# Patient Record
Sex: MALE | Race: BLACK OR AFRICAN AMERICAN | NOT HISPANIC OR LATINO | Employment: PART TIME | URBAN - METROPOLITAN AREA
[De-identification: names, ages, dates, MRNs, and addresses within clinical notes are randomized per-mention and may not be internally consistent; named-entity substitution may affect disease eponyms.]

---

## 2023-10-03 ENCOUNTER — TELEPHONE (OUTPATIENT)
Dept: UROLOGY | Facility: CLINIC | Age: 53
End: 2023-10-03

## 2023-10-03 NOTE — TELEPHONE ENCOUNTER
[facility] Call Center    Phone Message    May a detailed message be left on voicemail: yes     Reason for Call: Other: Patient is scheduled for a new infertility appt with Dr. Oleary on 11/30/23. Please reach out to patient for any necessary labs.     Action Taken: Message routed to:  [facility] ([facility] Urology    Travel Screening: Not Applicable

## 2023-10-05 DIAGNOSIS — Z31.41 ENCOUNTER FOR SEMEN ANALYSIS: Primary | ICD-10-CM

## 2023-10-26 NOTE — TELEPHONE ENCOUNTER
Action 2023 JTV 11:08 AM    Action Taken Patient states he had a SA done with [facility]. Patient gave VB OK to update medical records. Patient states he is in [place] for vacation and will not be back until 2023. Patient was advised to call and make an appointment with [facility] for another SA lab. [facility] gave patient the phone number to [facility].      MEDICAL RECORDS REQUEST   [facility]  Urology Clinic  [address] [place] [id number]  PHONE: [phone]  Fax: [phone]        FUTURE VISIT INFORMATION                                                   Long Carrasquillo, : 1970 scheduled for future visit at [facility]    APPOINTMENT INFORMATION:  Date: 2023  Provider:  Tj Oleary MD  Reason for Visit/Diagnosis: INFERTILITY    RECORDS REQUESTED FOR VISIT                                                     NOTES  STATUS/DETAILS   OFFICE NOTE from other specialist  yes, 2023 -- Riley Covarrubias MD  @ [facility]   OPERATIVE REPORT  yes   MEDICATION LIST  no   SEMEN ANALYSIS (LAST 2)  yes, 2023     PRE-VISIT CHECKLIST      Joint diagnostic appointment coordinated correctly          (ensure right order & amount of time) Yes   RECORD COLLECTION COMPLETE yes

## 2023-10-29 ENCOUNTER — HEALTH MAINTENANCE LETTER (OUTPATIENT)
Age: 53
End: 2023-10-29

## 2023-11-08 ENCOUNTER — PRE VISIT (OUTPATIENT)
Dept: UROLOGY | Facility: CLINIC | Age: 53
End: 2023-11-08
Payer: COMMERCIAL

## 2023-11-08 NOTE — CONFIDENTIAL NOTE
Reason for Visit: consult to discuss fertility    Orders/Procedures/Records:  in system    Rooming Requirements: normal      Ivanna Lopez  11/08/23  10:06 AM

## 2023-11-30 ENCOUNTER — OFFICE VISIT (OUTPATIENT)
Dept: UROLOGY | Facility: CLINIC | Age: 53
End: 2023-11-30
Payer: COMMERCIAL

## 2023-11-30 ENCOUNTER — LAB (OUTPATIENT)
Dept: LAB | Facility: CLINIC | Age: 53
End: 2023-11-30
Payer: COMMERCIAL

## 2023-11-30 ENCOUNTER — PRE VISIT (OUTPATIENT)
Dept: UROLOGY | Facility: CLINIC | Age: 53
End: 2023-11-30

## 2023-11-30 ENCOUNTER — ANCILLARY PROCEDURE (OUTPATIENT)
Dept: ULTRASOUND IMAGING | Facility: CLINIC | Age: 53
End: 2023-11-30
Attending: UROLOGY
Payer: COMMERCIAL

## 2023-11-30 VITALS
OXYGEN SATURATION: 100 % | WEIGHT: 165 LBS | HEART RATE: 89 BPM | DIASTOLIC BLOOD PRESSURE: 89 MMHG | RESPIRATION RATE: 18 BRPM | HEIGHT: 68 IN | SYSTOLIC BLOOD PRESSURE: 129 MMHG | BODY MASS INDEX: 25.01 KG/M2

## 2023-11-30 DIAGNOSIS — N50.812 LEFT TESTICULAR PAIN: ICD-10-CM

## 2023-11-30 DIAGNOSIS — R10.32 LEFT GROIN PAIN: ICD-10-CM

## 2023-11-30 DIAGNOSIS — R10.32 LEFT GROIN PAIN: Primary | ICD-10-CM

## 2023-11-30 DIAGNOSIS — Z31.41 ENCOUNTER FOR SEMEN ANALYSIS: ICD-10-CM

## 2023-11-30 LAB
ABNORMAL SPERM MORPHOLOGY: 90
ABSTINENCE DAYS: ABNORMAL
AGGLUTINATION: NO
ANALYSIS TEMP - CENTIGRADE: 19 CENTIGRADE
COLLECTION METHOD: ABNORMAL
COLLECTION SITE: ABNORMAL
CONSENT TO RELEASE TO PARTNER: NO
DAL- RECEIVED TIME: ABNORMAL
HEAD DEFECT: 87 %
IMMOTILE: 51 %
LIQUEFIED: YES
MIDPIECE DEFECT: 35 %
NON-PROGRESSIVE MOTILITY: 4 %
NORMAL SPERM MORPHOLOGY: 10 % NORMAL FORMS
PROGRESSIVE MOTILITY: 45 %
ROUND CELLS: <0.1 MILLION/ML
SPECIMEN PH: 7.2 PH
SPECIMEN VOLUME: 1.5 ML
SPERM CONCENTRATION: 337 MILLION/ML
TAIL DEFECT: 4 %
TIME OF ANALYSIS: ABNORMAL
TOTAL PROGRESSIVE MOTILE NUMBER: 228 MILLION
TOTAL SPERM NUMBER: 506 MILLION
VISCOUS: YES
VITALITY: ABNORMAL

## 2023-11-30 PROCEDURE — 76870 US EXAM SCROTUM: CPT | Mod: GC | Performed by: STUDENT IN AN ORGANIZED HEALTH CARE EDUCATION/TRAINING PROGRAM

## 2023-11-30 PROCEDURE — 93976 VASCULAR STUDY: CPT | Mod: GC | Performed by: STUDENT IN AN ORGANIZED HEALTH CARE EDUCATION/TRAINING PROGRAM

## 2023-11-30 PROCEDURE — 89322 SEMEN ANAL STRICT CRITERIA: CPT

## 2023-11-30 PROCEDURE — 99203 OFFICE O/P NEW LOW 30 MIN: CPT | Performed by: UROLOGY

## 2023-11-30 ASSESSMENT — PAIN SCALES - GENERAL: PAINLEVEL: EXTREME PAIN (8)

## 2023-11-30 ASSESSMENT — PATIENT HEALTH QUESTIONNAIRE - PHQ9
SUM OF ALL RESPONSES TO PHQ QUESTIONS 1-9: 25
10. IF YOU CHECKED OFF ANY PROBLEMS, HOW DIFFICULT HAVE THESE PROBLEMS MADE IT FOR YOU TO DO YOUR WORK, TAKE CARE OF THINGS AT HOME, OR GET ALONG WITH OTHER PEOPLE: EXTREMELY DIFFICULT
SUM OF ALL RESPONSES TO PHQ QUESTIONS 1-9: 25
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
## 2023-11-30 NOTE — NURSING NOTE
"Chief Complaint   Patient presents with    Consult     I need to see a urology specialist.        Blood pressure 129/89, pulse 89, resp. rate 18, height 1.727 m (5' 8\"), weight 74.8 kg (165 lb), SpO2 100%. Body mass index is 25.09 kg/m .    There is no problem list on file for this patient.      Allergies   Allergen Reactions    Vicodin [Hydrocodone-Acetaminophen] Diarrhea       Current Outpatient Medications   Medication Sig Dispense Refill    oxyCODONE-acetaminophen (PERCOCET) 5-325 MG per tablet Take 1 tablet by mouth every 6 hours as needed for moderate to severe pain (Patient not taking: Reported on 11/30/2023) 20 tablet 0    oxyCODONE-acetaminophen (PERCOCET) 5-325 MG per tablet Take 1-2 tablets by mouth every 6 hours as needed for moderate to severe pain (Patient not taking: Reported on 11/30/2023) 25 tablet 0       Social History     Tobacco Use    Smoking status: Every Day     Packs/day: .25     Types: Cigarettes    Smokeless tobacco: Never   Substance Use Topics    Alcohol use: Yes    Drug use: No       Chrissy Brooke  11/30/2023  8:44 AM     "

## 2023-11-30 NOTE — PATIENT INSTRUCTIONS
Schedule for testicular US, Dr. Oleary will MyChart you with the results.    It was a pleasure meeting with you today.  Thank you for allowing me and my team the privilege of caring for you today.  YOU are the reason we are here, and I truly hope we provided you with the excellent service you deserve.  Please let us know if there is anything else we can do for you so that we can be sure you are leaving completely satisfied with your care experience.
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
## 2023-12-01 NOTE — RESULT ENCOUNTER NOTE
Dear Long     Here are your recent test results which are NORMAL.  There are no concerns.    I don't see any reason for testis pain, sorry.      Thank You,    Please let me know if you have any questions!    Elijah YORK

## 2024-12-21 ENCOUNTER — HEALTH MAINTENANCE LETTER (OUTPATIENT)
Age: 54
End: 2024-12-21